# Patient Record
Sex: FEMALE | Race: WHITE | NOT HISPANIC OR LATINO
[De-identification: names, ages, dates, MRNs, and addresses within clinical notes are randomized per-mention and may not be internally consistent; named-entity substitution may affect disease eponyms.]

---

## 2018-06-15 ENCOUNTER — RESULT REVIEW (OUTPATIENT)
Age: 33
End: 2018-06-15

## 2018-08-07 ENCOUNTER — TRANSCRIPTION ENCOUNTER (OUTPATIENT)
Age: 33
End: 2018-08-07

## 2021-01-07 ENCOUNTER — RESULT REVIEW (OUTPATIENT)
Age: 36
End: 2021-01-07

## 2021-12-06 DIAGNOSIS — Z01.818 ENCOUNTER FOR OTHER PREPROCEDURAL EXAMINATION: ICD-10-CM

## 2021-12-06 DIAGNOSIS — Z00.00 ENCOUNTER FOR GENERAL ADULT MEDICAL EXAMINATION W/OUT ABNORMAL FINDINGS: ICD-10-CM

## 2021-12-09 ENCOUNTER — APPOINTMENT (OUTPATIENT)
Dept: SURGERY | Facility: CLINIC | Age: 36
End: 2021-12-09

## 2022-02-10 ENCOUNTER — APPOINTMENT (OUTPATIENT)
Dept: SURGERY | Facility: CLINIC | Age: 37
End: 2022-02-10

## 2023-01-25 ENCOUNTER — APPOINTMENT (OUTPATIENT)
Dept: ORTHOPEDIC SURGERY | Facility: CLINIC | Age: 38
End: 2023-01-25
Payer: MEDICAID

## 2023-01-25 VITALS — HEIGHT: 65 IN | BODY MASS INDEX: 48.82 KG/M2 | WEIGHT: 293 LBS

## 2023-01-25 DIAGNOSIS — Z78.9 OTHER SPECIFIED HEALTH STATUS: ICD-10-CM

## 2023-01-25 DIAGNOSIS — M19.90 UNSPECIFIED OSTEOARTHRITIS, UNSPECIFIED SITE: ICD-10-CM

## 2023-01-25 DIAGNOSIS — I51.9 HEART DISEASE, UNSPECIFIED: ICD-10-CM

## 2023-01-25 DIAGNOSIS — J45.909 UNSPECIFIED ASTHMA, UNCOMPLICATED: ICD-10-CM

## 2023-01-25 PROCEDURE — 73562 X-RAY EXAM OF KNEE 3: CPT | Mod: 50

## 2023-01-25 PROCEDURE — 99204 OFFICE O/P NEW MOD 45 MIN: CPT

## 2023-01-25 NOTE — HISTORY OF PRESENT ILLNESS
[Gradual] : gradual [8] : 8 [5] : 5 [Localized] : localized [Sharp] : sharp [Stabbing] : stabbing [Throbbing] : throbbing [Tightness] : tightness [Constant] : constant [Household chores] : household chores [Leisure] : leisure [Sleep] : sleep [Nothing helps with pain getting better] : Nothing helps with pain getting better [Standing] : standing [Walking] : walking [Stairs] : stairs [de-identified] : 01/25/2023: B/L Knees\par 37 year old female, presents today for a long Hx of B/L knees pain, LT greater than RT, which has recently worsened.  Pt C/O frequent "popping" crepitation in both knees, and has pain with stair climbing and lifting her body out of a chair.  Pt C/O swelling lump, and symptom of giving way in the LT knee.  Pt also C/O feeling of impending giving way in the LT knee with stair climbing.  Pt describes she has an uncomfortable mattress which increases pain.  \par  [] : no [FreeTextEntry1] : B/L Knees  [FreeTextEntry5] : Pt denies any traumatic recent injury.

## 2023-01-25 NOTE — IMAGING
[de-identified] : LT Knee Exam: Small effusion.  ROM is from 0 to 100 degrees.  There is slight laxity of the MCL, suggestive a mild varus.  Anterior draw test is negative.  There is no joint line tenderness.  There is peripatellar tenderness.  The patella is not hypermobile.  She was concerned about an area of localized swelling, which is perceived by her as a lump, which on exam is an area of softness which is filled with fluid.\par \par RT Knee Exam:  ROM is from full extension to at least 120 degrees.  There is slight MCL laxity.  Possible slight varus.  Anterior draw is negative.  Tender over the lateral patellar border.\par \par X-ray of the B/L knees (3 views) on 01/25/2023:\par RT Knee: Unremarkable.  Showing no articular cartilage narrowing.  No reactive changes.  PF view shows full cartilage width without glide or tilt.  There is no X-ray evidence of fractures or bone lesions.\par \par LT Knee: Unremarkable.  Showing no articular cartilage narrowing.  No reactive changes.  PF view shows full cartilage width without glide or tilt.

## 2023-01-25 NOTE — DISCUSSION/SUMMARY
[de-identified] : 1) I discussed the risks, benefits and alternatives of treatment options for the B/L knees PF pain syndrome, including activity modification, rest, home exercise, oral antiinflammatory medication, weight loss, CSI injections, and Viscosupplement gel injections, and observation.\par 2) We discussed that much of her pain is due to weight bearing stress on the knees due to her excessive body mass.\par 3) **I highly recommend the patient lose weight, and speak with her internist regarding bariatric surgery and options for weight loss treatment.\par 4) No further imaging is necessary at this time.  No MRI is necessary at this time.  \par 5) **Pt will continue with home exercise as tolerated.  The patient should avoid exercise and activity that aggravates pain. \par \par \par The patient will continue with conservative treatment as described above, and will F/U PRN.\par \par \par The patient was advised of the diagnosis.  The natural history of the pathology was explained in full to the patient in layman's terms, including but not limited to the risks, symptoms and available options for treatment.  We discussed the risks, benefits and alternatives of the treatment options and the advice I provided to the patient as listed above.  Pt was given the opportunity to ask questions, and all questions were answered.  The discussion was not limited to the above.\par \par Entered by Rj Lloyd acting as scribe.

## 2023-08-22 ENCOUNTER — APPOINTMENT (OUTPATIENT)
Dept: ORTHOPEDIC SURGERY | Facility: CLINIC | Age: 38
End: 2023-08-22
Payer: MEDICAID

## 2023-08-22 VITALS — HEIGHT: 65 IN | BODY MASS INDEX: 48.82 KG/M2 | WEIGHT: 293 LBS

## 2023-08-22 PROCEDURE — 99214 OFFICE O/P EST MOD 30 MIN: CPT

## 2023-08-22 NOTE — DISCUSSION/SUMMARY
[de-identified] : 1) I discussed the risks, benefits and alternatives of treatment options for the B/L knees PF pain syndrome, including activity modification, rest, home exercise, oral antiinflammatory medication, weight loss, CSI injections, and Viscosupplement gel injections, and observation. 2) We discussed that much of her pain is due to weight bearing stress on the knees due to her excessive body mass. 3) **I highly recommend the patient lose weight, and speak with her internist regarding bariatric surgery and options for weight loss treatment. 4) No further imaging is necessary at this time.  No MRI is necessary at this time.   5) **Pt will continue with home exercise as tolerated.  The patient should avoid exercise and activity that aggravates pain.  6) ** Rx Physical therapy for TEENA knees.    The patient will continue with conservative treatment as described above, and will F/U in 6 weeks.   The patient was advised of the diagnosis.  The natural history of the pathology was explained in full to the patient in layman's terms, including but not limited to the risks, symptoms and available options for treatment.  We discussed the risks, benefits and alternatives of the treatment options and the advice I provided to the patient as listed above.  Pt was given the opportunity to ask questions, and all questions were answered.  The discussion was not limited to the above.  Entered by Carlita Hicks acting as scribe.

## 2023-08-22 NOTE — HISTORY OF PRESENT ILLNESS
[Gradual] : gradual [8] : 8 [5] : 5 [Localized] : localized [Sharp] : sharp [Stabbing] : stabbing [Throbbing] : throbbing [Tightness] : tightness [Constant] : constant [Household chores] : household chores [Leisure] : leisure [Sleep] : sleep [Ice] : ice [Heat] : heat [Nothing helps with pain getting better] : Nothing helps with pain getting better [Standing] : standing [Walking] : walking [Stairs] : stairs [de-identified] : 01/25/2023: B/L Knees 37 year old female, presents today for a long Hx of B/L knees pain, LT greater than RT, which has recently worsened.  Pt C/O frequent "popping" crepitation in both knees, and has pain with stair climbing and lifting her body out of a chair.  Pt C/O swelling lump, and symptom of giving way in the LT knee.  Pt also C/O feeling of impending giving way in the LT knee with stair climbing.  Pt describes she has an uncomfortable mattress which increases pain.    08/22/2023: TEENA Knees Pt is here to follow up on bilateral knee pain. Pt reports that her knees have not improved since the last visit, she hears/feels the pop. Left knee pain is worst than the right, she cannot bend her left knee to take stairs. Pt states her knees have gotten excessively weak and she is in fear of falling.  [] : no [FreeTextEntry1] : B/L Knees  [FreeTextEntry5] : Pt denies any traumatic recent injury.

## 2023-08-22 NOTE — IMAGING
[de-identified] : Left Knee Exam: Small effusion.  ROM is from 0 to 120 degrees. There is pain at the extension limit.  Anterior draw test is negative.  There is tenderness over the anteromedial joint line. There is peripatellar tenderness. The patella is not hypermobile. There is no crepitation. There is no quad atrophy. Positive Maya's test medially.   Right Knee Exam:  ROM is from full extension with a painful endpoint to at least 120 degrees. There is medial and lateral joint line tenderness. The ligaments are well balanced. There is no crepitation. There is no quad atrophy.  Anterior draw is negative. Tender over the lateral patellar border.  X-ray of the B/L knees (3 views) on 01/25/2023: RT Knee: Unremarkable.  Showing no articular cartilage narrowing.  No reactive changes.  PF view shows full cartilage width without glide or tilt.  There is no X-ray evidence of fractures or bone lesions.  LT Knee: Unremarkable.  Showing no articular cartilage narrowing.  No reactive changes.  PF view shows full cartilage width without glide or tilt.

## 2023-10-03 ENCOUNTER — APPOINTMENT (OUTPATIENT)
Dept: ORTHOPEDIC SURGERY | Facility: CLINIC | Age: 38
End: 2023-10-03
Payer: MEDICAID

## 2023-10-03 VITALS — WEIGHT: 293 LBS | BODY MASS INDEX: 48.82 KG/M2 | HEIGHT: 65 IN

## 2023-10-03 DIAGNOSIS — M22.2X1 PATELLOFEMORAL DISORDERS, RIGHT KNEE: ICD-10-CM

## 2023-10-03 DIAGNOSIS — S83.242A OTHER TEAR OF MEDIAL MENISCUS, CURRENT INJURY, LEFT KNEE, INITIAL ENCOUNTER: ICD-10-CM

## 2023-10-03 DIAGNOSIS — M22.2X2 PATELLOFEMORAL DISORDERS, LEFT KNEE: ICD-10-CM

## 2023-10-03 PROCEDURE — 99213 OFFICE O/P EST LOW 20 MIN: CPT

## 2023-10-03 RX ORDER — ACETAMINOPHEN 325 MG/1
325 TABLET ORAL EVERY 8 HOURS
Qty: 90 | Refills: 0 | Status: ACTIVE | COMMUNITY
Start: 2023-10-03 | End: 1900-01-01

## 2023-10-04 ENCOUNTER — APPOINTMENT (OUTPATIENT)
Dept: PULMONOLOGY | Facility: CLINIC | Age: 38
End: 2023-10-04
Payer: MEDICAID

## 2023-10-04 VITALS
DIASTOLIC BLOOD PRESSURE: 70 MMHG | WEIGHT: 293 LBS | HEIGHT: 64.5 IN | BODY MASS INDEX: 49.41 KG/M2 | SYSTOLIC BLOOD PRESSURE: 98 MMHG | HEART RATE: 95 BPM

## 2023-10-04 DIAGNOSIS — Z83.79 FAMILY HISTORY OF OTHER DISEASES OF THE DIGESTIVE SYSTEM: ICD-10-CM

## 2023-10-04 DIAGNOSIS — G47.00 INSOMNIA, UNSPECIFIED: ICD-10-CM

## 2023-10-04 DIAGNOSIS — Z91.89 OTHER SPECIFIED PERSONAL RISK FACTORS, NOT ELSEWHERE CLASSIFIED: ICD-10-CM

## 2023-10-04 DIAGNOSIS — Z84.0 FAMILY HISTORY OF DISEASES OF THE SKIN AND SUBCUTANEOUS TISSUE: ICD-10-CM

## 2023-10-04 DIAGNOSIS — Z23 ENCOUNTER FOR IMMUNIZATION: ICD-10-CM

## 2023-10-04 PROCEDURE — G0008: CPT

## 2023-10-04 PROCEDURE — 94726 PLETHYSMOGRAPHY LUNG VOLUMES: CPT

## 2023-10-04 PROCEDURE — 94060 EVALUATION OF WHEEZING: CPT

## 2023-10-04 PROCEDURE — 99205 OFFICE O/P NEW HI 60 MIN: CPT | Mod: 25

## 2023-10-04 PROCEDURE — ZZZZZ: CPT

## 2023-10-04 PROCEDURE — 90686 IIV4 VACC NO PRSV 0.5 ML IM: CPT

## 2023-10-04 PROCEDURE — 94729 DIFFUSING CAPACITY: CPT

## 2023-10-04 RX ORDER — FAMOTIDINE 20 MG/1
20 TABLET, FILM COATED ORAL
Refills: 3 | Status: ACTIVE | COMMUNITY

## 2023-10-04 RX ORDER — ATENOLOL 50 MG/1
50 TABLET ORAL
Refills: 0 | Status: ACTIVE | COMMUNITY

## 2023-10-04 RX ORDER — FAMOTIDINE 10 MG/ML
20 INJECTION, SOLUTION INTRAVENOUS
Refills: 0 | Status: COMPLETED | COMMUNITY
End: 2023-10-04

## 2023-10-04 RX ORDER — METOPROLOL SUCCINATE 50 MG/1
50 TABLET, EXTENDED RELEASE ORAL
Refills: 0 | Status: COMPLETED | COMMUNITY
End: 2023-10-04

## 2023-10-04 RX ORDER — NORETHINDRONE 0.35 MG/1
TABLET ORAL
Refills: 0 | Status: ACTIVE | COMMUNITY

## 2023-10-04 RX ORDER — DIVALPROEX SODIUM 500 MG/1
500 TABLET, EXTENDED RELEASE ORAL
Refills: 0 | Status: ACTIVE | COMMUNITY

## 2023-10-04 RX ORDER — HYDROXYZINE PAMOATE 50 MG/1
50 CAPSULE ORAL
Refills: 0 | Status: ACTIVE | COMMUNITY

## 2023-10-04 RX ORDER — OLANZAPINE 20 MG/1
20 TABLET ORAL
Refills: 0 | Status: ACTIVE | COMMUNITY

## 2023-10-04 RX ORDER — METFORMIN ER 500 MG 500 MG/1
500 TABLET ORAL
Qty: 90 | Refills: 3 | Status: ACTIVE | COMMUNITY

## 2023-10-26 PROBLEM — Z91.89 RISK FACTORS FOR OBSTRUCTIVE SLEEP APNEA: Status: ACTIVE | Noted: 2023-10-26

## 2023-10-26 PROBLEM — Z84.0 FAMILY HISTORY OF PSORIATIC ARTHRITIS: Status: ACTIVE | Noted: 2023-10-04

## 2023-10-26 PROBLEM — Z83.79 FAMILY HISTORY OF GALLBLADDER DISEASE: Status: ACTIVE | Noted: 2023-10-04

## 2023-11-16 ENCOUNTER — APPOINTMENT (OUTPATIENT)
Dept: ORTHOPEDIC SURGERY | Facility: CLINIC | Age: 38
End: 2023-11-16

## 2023-11-21 ENCOUNTER — APPOINTMENT (OUTPATIENT)
Dept: SLEEP CENTER | Facility: CLINIC | Age: 38
End: 2023-11-21
Payer: MEDICAID

## 2023-11-21 ENCOUNTER — OUTPATIENT (OUTPATIENT)
Dept: OUTPATIENT SERVICES | Facility: HOSPITAL | Age: 38
LOS: 1 days | End: 2023-11-21
Payer: MEDICAID

## 2023-11-21 PROCEDURE — 95810 POLYSOM 6/> YRS 4/> PARAM: CPT

## 2023-11-21 PROCEDURE — 95810 POLYSOM 6/> YRS 4/> PARAM: CPT | Mod: 26

## 2023-11-23 ENCOUNTER — TRANSCRIPTION ENCOUNTER (OUTPATIENT)
Age: 38
End: 2023-11-23

## 2023-11-28 DIAGNOSIS — G47.33 OBSTRUCTIVE SLEEP APNEA (ADULT) (PEDIATRIC): ICD-10-CM

## 2023-12-04 ENCOUNTER — APPOINTMENT (OUTPATIENT)
Dept: PULMONOLOGY | Facility: CLINIC | Age: 38
End: 2023-12-04
Payer: MEDICAID

## 2023-12-04 VITALS
OXYGEN SATURATION: 96 % | WEIGHT: 293 LBS | RESPIRATION RATE: 16 BRPM | TEMPERATURE: 98 F | HEART RATE: 96 BPM | HEIGHT: 64.5 IN | SYSTOLIC BLOOD PRESSURE: 108 MMHG | DIASTOLIC BLOOD PRESSURE: 76 MMHG | BODY MASS INDEX: 49.41 KG/M2

## 2023-12-04 PROCEDURE — 99215 OFFICE O/P EST HI 40 MIN: CPT

## 2023-12-21 NOTE — HISTORY OF PRESENT ILLNESS
[TextBox_4] : Ms. Olivia is a 38 year-old female with a history of obesity, CALIN/OHS, asthma, sinus tachycardia, knee OA, prediabetes, PCOS, Bipolar disorder with psychotic tendencies, and anxiety who presents for follow-up. Recent diagnostic PSG with mild CALIN (AHI 6 events/hour) and sleep-related hypoventilation. Transcutaneous CO2 monitoring ranged from 40-57 mm Hg. Also, patient with persistent mild-moderate oxygen desaturation, which appeared to be independent of sleep-disordered breathing consistent with nocturnal hypoventilation.  Regarding her obesity and possible CALIN, she reports that her sleep is not restful. Reports having to sleep all day long. Can't breathe while lying on her back. She is currently living with her mother. Not working due to mental disability. She reports excessive daytime sleepiness. She sleeps more during the day than at night. She falls asleep at night at around 1-2AM, wakes up at 1-2PM, then takes a nap around 4pm-7pm. Sleep is not restful. She has frequent awakenings. Reports frequent headaches. Reports nonrestorative sleep. Not sure if she snores. No reported witnessed apneas. Has dry mouth in the morning upon awakening and has water at the bedside. She has significant psychiatric history, including anxiety, insomnia, and Bipolar disorder with psychotic tendencies for which she takes Depakote, Zyprexa, and Hydroxyzine prn. Does not drink coffee. Does not drink soda regularly. No caffeinated tea. Does not drink alcohol. She is on her phone until she goes to sleep. Puts music on to go to sleep.  Of note, she recently cleaned out her room yesterday and was exposed to significant dust and roaches that flared her asthma. She took albuterol with improvement. She also took her Flonase and Claritin. She feels significantly improved today. Last albuterol use prior to yesterday was more than 1 month ago. She has a chronic history of asthma, triggered by allergies. Reports occasional panic attacks due to not being able to breathe. Asthma symptoms include shortness of breath, chest tightness, wheezing, and cough. Reports seasonal allergies, pollen, dust, cats, dogs, birds, trees, and grass. She uses Flonase prn for allergic rhinitis. Used to take Claritin, but not currently.

## 2023-12-21 NOTE — ASSESSMENT
[FreeTextEntry1] : -  Ms. Olivia is a 38 year-old female with a history of obesity, CALIN/OHS, asthma, sinus tachycardia, knee OA, prediabetes, PCOS, Bipolar disorder with psychotic tendencies, and anxiety who presents for follow-up. Recent diagnostic PSG with mild CALIN (AHI 6 events/hour) and sleep-related hypoventilation. Transcutaneous CO2 monitoring ranged from 40-57 mm Hg. Also, patient with persistent mild-moderate oxygen desaturation, which appeared to be independent of sleep-disordered breathing consistent with nocturnal hypoventilation. Of note, she recently cleaned out her room yesterday and was exposed to significant dust and roaches that flared her asthma. She took albuterol with improvement. She also took her Flonase and Claritin. She feels significantly improved today. Last albuterol use prior to yesterday was more than 1 month ago. She has a chronic history of asthma, triggered by allergies. Reports occasional panic attacks due to not being able to breathe. Symptoms include shortness of breath, chest tightness, wheezing, and cough. Reports seasonal allergies, pollen, dust, cats, dogs, birds, trees, and grass.     PFTs (Oct 2023) with severe restriction, low lung volumes, and moderately reduced diffusing capacity. There is a positive bronchodilator response.  ASSESSMENT: Mild obstructive sleep apnea Obesity hypoventilation syndrome Mild intermittent asthma Seasonal allergic rhinitis Morbid obesity Primary insomnia Poor sleep hygiene  PLAN: - Continue to use albuterol inhaler prn - She is declining to use Symbicort or any inhaled steroid - Asthma symptoms include dyspnea, chest tightness, wheezing and coughing. Symptoms significantly improved today - Avoid triggers, including seasonal allergens, dust, cats, dogs, birds, trees, and grass - Recommend to repeat PFTs in March 2023 to assess for stability/improvement in her severe restrictive lung disease, which is likely primarily due to her morbid obesity - Use fluticasone nasal spray 1 spray per nostril twice daily - Loratadine 10 mg daily PRN for allergies - Recommend CPAP titration with mandatory transcutaneous CO2 monitoring, transition to Bilevel titration if necessary for treatment of her CALIN/OHS - We discussed again the importance of adequate sleep hygiene - Avoid watching TV in bed prior to going to sleep. Advised not to remain in bed for prolonged periods of time if unable to sleep. Avoid coffee, tea, soda in the evening. Minimize coffee intake to 1-2 cups daily - We discussed previously the potential benefit of sleep restriction and I provided her with information for this - Recommend dietary modifications, including decreased carbohydrate intake and increasing intake of lean protein and green vegetables - Recommend daily exercise of at least 30-60 minutes of brisk walking - Light weight lifting and situps/pushups would also be helpful - Up to date with influenza vaccine - Pending COVID-19 booster - Follow-up in 2-3 months or sooner prn

## 2024-02-22 RX ORDER — ALBUTEROL SULFATE 90 UG/1
108 (90 BASE) AEROSOL, METERED RESPIRATORY (INHALATION) EVERY 6 HOURS
Qty: 1 | Refills: 5 | Status: ACTIVE | COMMUNITY
Start: 1900-01-01 | End: 1900-01-01

## 2024-03-08 ENCOUNTER — OUTPATIENT (OUTPATIENT)
Dept: OUTPATIENT SERVICES | Facility: HOSPITAL | Age: 39
LOS: 1 days | End: 2024-03-08
Payer: MEDICAID

## 2024-03-08 ENCOUNTER — APPOINTMENT (OUTPATIENT)
Dept: SLEEP CENTER | Facility: CLINIC | Age: 39
End: 2024-03-08
Payer: MEDICAID

## 2024-03-08 PROCEDURE — 95811 POLYSOM 6/>YRS CPAP 4/> PARM: CPT

## 2024-03-08 PROCEDURE — 95811 POLYSOM 6/>YRS CPAP 4/> PARM: CPT | Mod: 26

## 2024-03-14 DIAGNOSIS — G47.33 OBSTRUCTIVE SLEEP APNEA (ADULT) (PEDIATRIC): ICD-10-CM

## 2024-03-29 ENCOUNTER — NON-APPOINTMENT (OUTPATIENT)
Age: 39
End: 2024-03-29

## 2024-04-18 ENCOUNTER — APPOINTMENT (OUTPATIENT)
Dept: PULMONOLOGY | Facility: CLINIC | Age: 39
End: 2024-04-18
Payer: MEDICAID

## 2024-04-18 VITALS
HEART RATE: 92 BPM | BODY MASS INDEX: 48.82 KG/M2 | SYSTOLIC BLOOD PRESSURE: 129 MMHG | WEIGHT: 293 LBS | OXYGEN SATURATION: 98 % | DIASTOLIC BLOOD PRESSURE: 82 MMHG | HEIGHT: 65 IN

## 2024-04-18 DIAGNOSIS — E66.01 MORBID (SEVERE) OBESITY DUE TO EXCESS CALORIES: ICD-10-CM

## 2024-04-18 DIAGNOSIS — E66.2 MORBID (SEVERE) OBESITY WITH ALVEOLAR HYPOVENTILATION: ICD-10-CM

## 2024-04-18 DIAGNOSIS — J98.4 OTHER DISORDERS OF LUNG: ICD-10-CM

## 2024-04-18 DIAGNOSIS — J45.20 MILD INTERMITTENT ASTHMA, UNCOMPLICATED: ICD-10-CM

## 2024-04-18 DIAGNOSIS — J30.2 OTHER SEASONAL ALLERGIC RHINITIS: ICD-10-CM

## 2024-04-18 DIAGNOSIS — G47.33 OBSTRUCTIVE SLEEP APNEA (ADULT) (PEDIATRIC): ICD-10-CM

## 2024-04-18 PROCEDURE — 94726 PLETHYSMOGRAPHY LUNG VOLUMES: CPT

## 2024-04-18 PROCEDURE — 94729 DIFFUSING CAPACITY: CPT

## 2024-04-18 PROCEDURE — ZZZZZ: CPT

## 2024-04-18 PROCEDURE — 94060 EVALUATION OF WHEEZING: CPT

## 2024-04-18 PROCEDURE — 99215 OFFICE O/P EST HI 40 MIN: CPT | Mod: 25

## 2024-04-18 RX ORDER — FLUTICASONE PROPIONATE 50 MCG
50 SPRAY, SUSPENSION NASAL
Refills: 0 | Status: DISCONTINUED | COMMUNITY
End: 2024-04-18

## 2024-04-18 RX ORDER — MOMETASONE 50 UG/1
50 SPRAY, METERED NASAL TWICE DAILY
Qty: 3 | Refills: 5 | Status: ACTIVE | COMMUNITY
Start: 2024-04-18 | End: 1900-01-01

## 2024-04-18 NOTE — REASON FOR VISIT
[Follow-Up] : a follow-up visit [Asthma] : asthma [Sleep Apnea] : sleep apnea [Nocturnal Hypoventilation] : nocturnal hypoventilation

## 2024-06-09 PROBLEM — E66.2 OBESITY HYPOVENTILATION SYNDROME: Status: ACTIVE | Noted: 2023-12-01

## 2024-06-09 PROBLEM — E66.01 MORBID OBESITY: Status: ACTIVE | Noted: 2021-12-06

## 2024-06-09 PROBLEM — J45.20 MILD INTERMITTENT ASTHMA WITHOUT COMPLICATION: Status: ACTIVE | Noted: 2023-10-26

## 2024-06-09 PROBLEM — G47.33 OBSTRUCTIVE SLEEP APNEA SYNDROME, MILD: Status: ACTIVE | Noted: 2023-12-01

## 2024-06-09 PROBLEM — J98.4 RESTRICTIVE LUNG DISEASE: Status: ACTIVE | Noted: 2023-10-26

## 2024-06-09 NOTE — HISTORY OF PRESENT ILLNESS
[Never] : never [TextBox_4] : Ms. Olivia is a 38 year-old female with a history of obesity, CALIN/OHS, asthma, sinus tachycardia, knee OA, prediabetes, PCOS, Bipolar disorder with psychotic tendencies, and anxiety who presents for follow-up. Recent diagnostic PSG with mild CALIN (AHI 6 events/hour) and sleep-related hypoventilation. Transcutaneous CO2 monitoring ranged from 40-57 mm Hg. Also, patient with persistent mild-moderate oxygen desaturation, which appeared to be independent of sleep-disordered breathing consistent with nocturnal hypoventilation. She then went for CPAP titration in March 2024 with frequency of sleep disordered breathing reduced to within normal limits with CPAP of 15 cm H2O. Sleep continuity was improved with CPAP. Oxygen saturation was improved with CPAP. TCO2 ranged from 35-48 mm Hg, which is normalized compared to the hypercapnia seen during diagnostic PSG (40-57 mm Hg). She was started on CPAP 15 cm H2O using the F&P Cady full face mask, XS, but she has not yet received.  Regarding her obesity and possible CALIN, she reports that her sleep is not restful. Reports having to sleep all day long. Can't breathe while lying on her back. She is currently living with her mother. Not working due to mental disability. She reports excessive daytime sleepiness. She sleeps more during the day than at night. She falls asleep at night at around 1-2AM, wakes up at 1-2PM, then takes a nap around 4pm-7pm. Sleep is not restful. She has frequent awakenings. Reports frequent headaches. Reports nonrestorative sleep. Not sure if she snores. No reported witnessed apneas. Has dry mouth in the morning upon awakening and has water at the bedside. She has significant psychiatric history, including anxiety, insomnia, and Bipolar disorder with psychotic tendencies for which she takes Depakote, Zyprexa, and Hydroxyzine prn. Does not drink coffee. Does not drink soda regularly. No caffeinated tea. Does not drink alcohol. She is on her phone until she goes to sleep. Puts music on to go to sleep.  She has a chronic history of asthma, triggered by allergies. Reports occasional panic attacks due to not being able to breathe. Asthma symptoms include shortness of breath, chest tightness, wheezing, and cough. She takes Albuterol prn, has not required in the past few weeks. Reports seasonal allergies, pollen, dust, cats, dogs, birds, trees, and grass. She uses Flonase prn for allergic rhinitis. Used to take Claritin, but not currently.  PFTs (April 2024) with moderate restriction, low lung volumes, normal diffusing capacity, and positive bronchodilator response.

## 2024-06-09 NOTE — PHYSICAL EXAM
[No Acute Distress] : no acute distress [Well Nourished] : well nourished [Well Developed] : well developed [Normal Oropharynx] : normal oropharynx [Normal Appearance] : normal appearance [Supple] : supple [No Neck Mass] : no neck mass [No JVD] : no jvd [Normal Rate/Rhythm] : normal rate/rhythm [Normal Pulses] : normal pulses [Normal S1, S2] : normal s1, s2 [No Murmurs] : no murmurs [No Resp Distress] : no resp distress [No Acc Muscle Use] : no acc muscle use [Clear to Auscultation Bilaterally] : clear to auscultation bilaterally [No Abnormalities] : no abnormalities [Benign] : benign [Normal Gait] : normal gait [No Clubbing] : no clubbing [No Cyanosis] : no cyanosis [No Edema] : no edema [FROM] : FROM [Normal Color/ Pigmentation] : normal color/ pigmentation [No Focal Deficits] : no focal deficits [Oriented x3] : oriented x3 [Normal Affect] : normal affect [TextBox_2] : morbid obesity [TextBox_11] : crowded [TextBox_44] : large

## 2024-06-09 NOTE — ASSESSMENT
[FreeTextEntry1] : -  Ms. Olivia is a 38 year-old female with a history of obesity, CALIN/OHS, asthma, sinus tachycardia, knee OA, prediabetes, PCOS, Bipolar disorder with psychotic tendencies, and anxiety who presents for follow-up. Recent diagnostic PSG with mild CALIN (AHI 6 events/hour) and sleep-related hypoventilation. Transcutaneous CO2 monitoring ranged from 40-57 mm Hg. Also, patient with persistent mild-moderate oxygen desaturation, which appeared to be independent of sleep-disordered breathing consistent with nocturnal hypoventilation. She then went for CPAP titration in March 2024 with frequency of sleep disordered breathing reduced to within normal limits with CPAP of 15 cm H2O. Sleep continuity was improved with CPAP. Oxygen saturation was improved with CPAP. TCO2 ranged from 35-48 mm Hg, which is normalized compared to the hypercapnia seen during diagnostic PSG (40-57 mm Hg). She was started on CPAP 15 cm H2O using the F&P Cady full face mask, XS, but she has not yet received.  # CALIN/OHS - She is still waiting to receive CPAP - I personally reached out to the DME, hopefully she should receive her CPAP soon - Start CPAP 15 cm H2O every night using the F&P Cady full face mask, XS - Follow-up within 1-2 months of wearing her CPAP  # Mild intermittent asthma: - Asthma is triggered by allergies - Symptoms include dyspnea, chest tightness, wheezing, and cough - Continue albuterol inhaler PRN - Has not needed to use albuterol for several weeks  # Seasonal allergies: - Allergens include pollen, dust, cats, dogs, birds, trees, and grass - Start mometasone nasal spray 2 sprays per nostril twice daily - Not requiring to take Claritin  # Restrictive lung disease: - PFTs (April 2024) with moderate restriction, low lung volumes, normal DLCO, and positive BD response - The positive BD response is due to her underlying mild intermittent asthma - The restriction with normal diffusing capacity is likely extrathoracic due to her obesity - Recommend daily exercise, including 30 minutes of brisk walking daily - Dietary modifications  # Primary insomnia with poor sleep hygiene: - Avoid watching TV in bed prior to going to sleep. Advised not to remain in bed for prolonged periods of time if unable to sleep. Avoid coffee, tea, soda in the evening. Minimize coffee intake to 1-2 cups daily  # HCM: - Up to date with influenza vaccine - Follow-up within 1-2 months of starting nocturnal CPAP therapy

## 2024-07-21 ENCOUNTER — NON-APPOINTMENT (OUTPATIENT)
Age: 39
End: 2024-07-21

## 2024-07-22 ENCOUNTER — APPOINTMENT (OUTPATIENT)
Dept: ORTHOPEDIC SURGERY | Facility: CLINIC | Age: 39
End: 2024-07-22
Payer: MEDICAID

## 2024-07-22 ENCOUNTER — NON-APPOINTMENT (OUTPATIENT)
Age: 39
End: 2024-07-22

## 2024-07-22 VITALS — BODY MASS INDEX: 48.82 KG/M2 | WEIGHT: 293 LBS | HEIGHT: 65 IN

## 2024-07-22 DIAGNOSIS — M22.2X2 PATELLOFEMORAL DISORDERS, LEFT KNEE: ICD-10-CM

## 2024-07-22 DIAGNOSIS — M22.42 CHONDROMALACIA PATELLAE, LEFT KNEE: ICD-10-CM

## 2024-07-22 DIAGNOSIS — M22.2X1 PATELLOFEMORAL DISORDERS, RIGHT KNEE: ICD-10-CM

## 2024-07-22 DIAGNOSIS — M22.41 CHONDROMALACIA PATELLAE, RIGHT KNEE: ICD-10-CM

## 2024-07-22 PROCEDURE — 99203 OFFICE O/P NEW LOW 30 MIN: CPT

## 2024-07-22 RX ORDER — ACETAMINOPHEN 325 MG/1
325 TABLET, FILM COATED ORAL EVERY 6 HOURS
Qty: 60 | Refills: 1 | Status: ACTIVE | COMMUNITY
Start: 2024-07-22 | End: 1900-01-01

## 2024-07-25 ENCOUNTER — APPOINTMENT (OUTPATIENT)
Dept: PULMONOLOGY | Facility: CLINIC | Age: 39
End: 2024-07-25
Payer: MEDICAID

## 2024-07-25 VITALS
HEIGHT: 65 IN | SYSTOLIC BLOOD PRESSURE: 118 MMHG | BODY MASS INDEX: 48.82 KG/M2 | WEIGHT: 293 LBS | RESPIRATION RATE: 17 BRPM | HEART RATE: 88 BPM | DIASTOLIC BLOOD PRESSURE: 77 MMHG | OXYGEN SATURATION: 93 %

## 2024-07-25 DIAGNOSIS — J30.2 OTHER SEASONAL ALLERGIC RHINITIS: ICD-10-CM

## 2024-07-25 DIAGNOSIS — J45.20 MILD INTERMITTENT ASTHMA, UNCOMPLICATED: ICD-10-CM

## 2024-07-25 DIAGNOSIS — E66.01 MORBID (SEVERE) OBESITY DUE TO EXCESS CALORIES: ICD-10-CM

## 2024-07-25 DIAGNOSIS — E66.2 MORBID (SEVERE) OBESITY WITH ALVEOLAR HYPOVENTILATION: ICD-10-CM

## 2024-07-25 DIAGNOSIS — G47.33 OBSTRUCTIVE SLEEP APNEA (ADULT) (PEDIATRIC): ICD-10-CM

## 2024-07-25 PROCEDURE — 99214 OFFICE O/P EST MOD 30 MIN: CPT

## 2024-07-25 NOTE — HISTORY OF PRESENT ILLNESS
[Never] : never [TextBox_4] : Ms. Olivia is a 38 year-old female with a history of obesity, CALIN/OHS, asthma, sinus tachycardia, knee OA, prediabetes, PCOS, Bipolar disorder with psychotic tendencies, and anxiety who presents for follow-up. Recent diagnostic PSG in Nov 2023 with mild CALIN (AHI 6 events/hour) and sleep-related hypoventilation. Transcutaneous CO2 monitoring ranged from 40-57 mm Hg. Also, patient with persistent mild-moderate oxygen desaturation, which appeared to be independent of sleep-disordered breathing consistent with nocturnal hypoventilation. She then went for CPAP titration in March 2024 with frequency of sleep disordered breathing reduced to within normal limits with CPAP of 15 cm H2O. Sleep continuity was improved with CPAP. Oxygen saturation was improved with CPAP. TCO2 ranged from 35-48 mm Hg, which is normalized compared to the hypercapnia seen during diagnostic PSG (40-57 mm Hg). She was started on CPAP 15 cm H2O using the F&P Cady full face mask, XS. Recent compliance data with usage 30/30 days for average 9 hrs 32 min with no significant leak and therapeutic AHI 1.0. She cleans the mask daily and tubing weekly. She changes the water in the humidification chamber daily. Uses distilled water only. She reports significantly improved daytime somnolence. She does not require to take as many naps now. She wakes up around 9-12, earlier than she used. Goes to sleep at around 12-2am. She has more energy. Does not wake up as unrefreshed as previously. No morning headache. She is adherent with her psychiatric regimen for her anxiety, insomnia, and bipolar disorder, including Depakote, Zyprexa, and hydroxyzine prn. She occasionally drinks coffee or iced tea. Also drinks soda occasionally. No alcohol use. She uses her phone prior to going to sleep. Continues to put music on to go to sleep.  She has a chronic history of asthma, triggered by allergies. Reports occasional panic attacks due to not being able to breathe. Asthma symptoms include shortness of breath, chest tightness, wheezing, and cough. She takes Albuterol prn, has not required in the past few months. Reports seasonal allergies, pollen, dust, cats, dogs, birds, trees, and grass. She uses Flonase prn for allergic rhinitis, last used 1 week ago. Stopped taking loratadine given that she takes hydroxyzine prn for anxiety/panic attacks.   PFTs (April 2024) with moderate restriction, low lung volumes, normal diffusing capacity, and positive bronchodilator response.

## 2024-07-25 NOTE — ASSESSMENT
[FreeTextEntry1] : -  Ms. Olivia is a 38 year-old female with a history of obesity, CALIN/OHS, asthma, seasonal allergies, sinus tachycardia, knee OA, prediabetes, PCOS, Bipolar disorder with psychotic tendencies, and anxiety who presents for follow-up. Recent diagnostic PSG in Nov 2023 with mild CALIN (AHI 6 events/hour) and sleep-related hypoventilation. Transcutaneous CO2 monitoring ranged from 40-57 mm Hg. Also, patient with persistent mild-moderate oxygen desaturation, which appeared to be independent of sleep-disordered breathing consistent with nocturnal hypoventilation. She then went for CPAP titration in March 2024 with frequency of sleep disordered breathing reduced to within normal limits with CPAP of 15 cm H2O. Sleep continuity was improved with CPAP. Oxygen saturation was improved with CPAP. TCO2 ranged from 35-48 mm Hg, which is normalized compared to the hypercapnia seen during diagnostic PSG (40-57 mm Hg). She was started on CPAP 15 cm H2O using the [employer] full face mask, XS. Recent compliance data with usage 30/30 days for average 9 hrs 32 min with no significant leak and therapeutic AHI 1.0. She cleans the mask daily and tubing weekly. She changes the water in the humidification chamber daily. Uses distilled water only. She reports significantly improved daytime somnolence. She does not require to take as many naps now. She wakes up around 9-12, earlier than she used. Goes to sleep at around 12-2am. She has more energy. Does not wake up as unrefreshed as previously. No morning headache. She is adherent with her psychiatric regimen for her anxiety, insomnia, and bipolar disorder, including Depakote, Zyprexa, and hydroxyzine prn. She occasionally drinks coffee or iced tea. Also drinks soda occasionally. No alcohol use. She uses her phone prior to going to sleep. Continues to put music on to go to sleep.  # CALIN/OHS - Continue CPAP 15 cm H2O every night and PRN day - Clean mask daily and tubing weekly - Change water in humidification chamber daily - Improved excessive daytime sleepiness and nonrestorative sleep  # Mild intermittent asthma: - Asthma is triggered by allergies - Symptoms include dyspnea, chest tightness, wheezing, and cough - Continue albuterol inhaler PRN - Has not needed to use albuterol for several months  # Seasonal allergies: - Allergens include pollen, dust, cats, dogs, birds, trees, and grass - Continue fluticasone nasal spray 2 sprays per nostril twice daily as necessary, last used 1 week ago - Stopped taking loratadine given that she takes hydroxyzine prn for anxiety/panic attacks  # Restrictive lung disease: - PFTs (April 2024) with moderate restriction, low lung volumes, normal DLCO, and positive BD response - The positive BD response is due to her underlying mild intermittent asthma - The restriction with normal diffusing capacity is likely extrathoracic due to her obesity - Continue dietary modifications and daily exercise  # Primary insomnia with poor sleep hygiene: - Avoid watching TV in bed prior to going to sleep. Advised not to remain in bed for prolonged periods of time if unable to sleep. Avoid coffee, tea, soda in the evening. Minimize coffee intake to 1-2 cups daily  # HCM: - Up to date with influenza vaccine - Follow-up in 6 months or sooner PRN

## 2024-11-07 ENCOUNTER — APPOINTMENT (OUTPATIENT)
Dept: GASTROENTEROLOGY | Facility: CLINIC | Age: 39
End: 2024-11-07

## 2025-01-09 ENCOUNTER — APPOINTMENT (OUTPATIENT)
Dept: PULMONOLOGY | Facility: CLINIC | Age: 40
End: 2025-01-09

## 2025-01-27 ENCOUNTER — APPOINTMENT (OUTPATIENT)
Dept: ORTHOPEDIC SURGERY | Facility: CLINIC | Age: 40
End: 2025-01-27
Payer: MEDICAID

## 2025-01-27 VITALS — HEIGHT: 65 IN | WEIGHT: 293 LBS | BODY MASS INDEX: 48.82 KG/M2

## 2025-01-27 DIAGNOSIS — M22.2X2 PATELLOFEMORAL DISORDERS, LEFT KNEE: ICD-10-CM

## 2025-01-27 DIAGNOSIS — M22.41 CHONDROMALACIA PATELLAE, RIGHT KNEE: ICD-10-CM

## 2025-01-27 DIAGNOSIS — M22.2X1 PATELLOFEMORAL DISORDERS, RIGHT KNEE: ICD-10-CM

## 2025-01-27 DIAGNOSIS — M25.561 PAIN IN RIGHT KNEE: ICD-10-CM

## 2025-01-27 DIAGNOSIS — M22.42 CHONDROMALACIA PATELLAE, LEFT KNEE: ICD-10-CM

## 2025-01-27 DIAGNOSIS — M25.562 PAIN IN RIGHT KNEE: ICD-10-CM

## 2025-01-27 PROCEDURE — 99213 OFFICE O/P EST LOW 20 MIN: CPT

## 2025-08-26 ENCOUNTER — APPOINTMENT (OUTPATIENT)
Dept: INFECTIOUS DISEASE | Facility: CLINIC | Age: 40
End: 2025-08-26